# Patient Record
Sex: MALE
[De-identification: names, ages, dates, MRNs, and addresses within clinical notes are randomized per-mention and may not be internally consistent; named-entity substitution may affect disease eponyms.]

---

## 2019-11-19 ENCOUNTER — RECORD ABSTRACTING (OUTPATIENT)
Age: 74
End: 2019-11-19

## 2019-11-19 DIAGNOSIS — Z78.9 OTHER SPECIFIED HEALTH STATUS: ICD-10-CM

## 2019-11-19 DIAGNOSIS — Z87.438 PERSONAL HISTORY OF OTHER DISEASES OF MALE GENITAL ORGANS: ICD-10-CM

## 2019-11-19 DIAGNOSIS — R31.29 OTHER MICROSCOPIC HEMATURIA: ICD-10-CM

## 2019-11-19 DIAGNOSIS — R39.9 UNSPECIFIED SYMPTOMS AND SIGNS INVOLVING THE GENITOURINARY SYSTEM: ICD-10-CM

## 2019-11-19 DIAGNOSIS — Z87.898 PERSONAL HISTORY OF OTHER SPECIFIED CONDITIONS: ICD-10-CM

## 2019-11-19 LAB
PSA FREE FLD-MCNC: 33
PSA FREE SERPL-MCNC: 0.8
PSA SERPL-MCNC: 2.4
TESTOST BND SERPL-MCNC: 403.8

## 2019-11-19 RX ORDER — ATORVASTATIN CALCIUM 80 MG/1
TABLET, FILM COATED ORAL
Refills: 0 | Status: ACTIVE | COMMUNITY

## 2019-11-19 RX ORDER — ASPIRIN 81 MG
81 TABLET, DELAYED RELEASE (ENTERIC COATED) ORAL
Refills: 0 | Status: ACTIVE | COMMUNITY

## 2019-12-10 PROBLEM — Z00.00 ENCOUNTER FOR PREVENTIVE HEALTH EXAMINATION: Noted: 2019-12-10

## 2019-12-17 ENCOUNTER — APPOINTMENT (OUTPATIENT)
Dept: UROLOGY | Facility: CLINIC | Age: 74
End: 2019-12-17

## 2019-12-19 ENCOUNTER — APPOINTMENT (OUTPATIENT)
Dept: UROLOGY | Facility: CLINIC | Age: 74
End: 2019-12-19
Payer: COMMERCIAL

## 2019-12-19 VITALS
BODY MASS INDEX: 25.01 KG/M2 | SYSTOLIC BLOOD PRESSURE: 134 MMHG | HEIGHT: 68 IN | TEMPERATURE: 94.8 F | WEIGHT: 165 LBS | DIASTOLIC BLOOD PRESSURE: 80 MMHG

## 2019-12-19 DIAGNOSIS — R31.9 HEMATURIA, UNSPECIFIED: ICD-10-CM

## 2019-12-19 DIAGNOSIS — Z00.00 ENCOUNTER FOR GENERAL ADULT MEDICAL EXAMINATION W/OUT ABNORMAL FINDINGS: ICD-10-CM

## 2019-12-19 LAB
BILIRUB UR QL STRIP: NEGATIVE
CLARITY UR: CLEAR
COLLECTION METHOD: NORMAL
GLUCOSE UR-MCNC: NEGATIVE
HCG UR QL: 0.2 EU/DL
HGB UR QL STRIP.AUTO: NEGATIVE
KETONES UR-MCNC: NEGATIVE
LEUKOCYTE ESTERASE UR QL STRIP: NEGATIVE
NITRITE UR QL STRIP: NEGATIVE
PH UR STRIP: 5
PROT UR STRIP-MCNC: NEGATIVE
SP GR UR STRIP: 1.02

## 2019-12-19 PROCEDURE — 81003 URINALYSIS AUTO W/O SCOPE: CPT | Mod: QW

## 2019-12-19 PROCEDURE — 76857 US EXAM PELVIC LIMITED: CPT

## 2019-12-19 PROCEDURE — 99213 OFFICE O/P EST LOW 20 MIN: CPT | Mod: 25

## 2019-12-19 PROCEDURE — 51741 ELECTRO-UROFLOWMETRY FIRST: CPT

## 2019-12-19 NOTE — PHYSICAL EXAM
[General Appearance - Well Developed] : well developed [General Appearance - Well Nourished] : well nourished [Normal Appearance] : normal appearance [General Appearance - In No Acute Distress] : no acute distress [Well Groomed] : well groomed [Abdomen Soft] : soft [Costovertebral Angle Tenderness] : no ~M costovertebral angle tenderness [Abdomen Tenderness] : non-tender [Skin Color & Pigmentation] : normal skin color and pigmentation [] : no respiratory distress [Heart Rate And Rhythm] : Heart rate and rhythm were normal [Mood] : the mood was normal [Affect] : the affect was normal [Oriented To Time, Place, And Person] : oriented to person, place, and time [No Focal Deficits] : no focal deficits [Normal Station and Gait] : the gait and station were normal for the patient's age [Not Anxious] : not anxious [Inguinal Lymph Nodes Enlarged Bilaterally] : inguinal [FreeTextEntry1] : No change from last visit

## 2019-12-19 NOTE — ASSESSMENT
[FreeTextEntry1] : BPH stable with no signs of gross hematuria. Flow acceptable but low volume to start.\par \par Unless symptoms change I will see this gentleman in August for followup of his PPH prostate and the urine atypia. He was again instructed to come with a full bladder to perform uroflowmetry at that time.\par \par Lorena Tee M.D.

## 2019-12-19 NOTE — HISTORY OF PRESENT ILLNESS
[FreeTextEntry1] : 74M with h/o BPH found to have abnormal cytology with atypia. He underwent Cystoscopy on 9/10/19 which found BPH only. He also underwent CT scan which confirmed an enlarged prostate only on 8/26/19. He is here for 3 month followup of these problems for URoflometry, PVR, cytology and FISH test.\par \par Uroflow today good flow with small voided volume. PVR 37 cc, Prostate 44 G.  \par \par PSA 2.4 (33%) 08/07/2019.\par UA dipstick today negaive.\par \par

## 2019-12-19 NOTE — LETTER BODY
[Dear  ___] : Dear  [unfilled], [Courtesy Letter:] : I had the pleasure of seeing your patient, [unfilled], in my office today. [Please see my note below.] : Please see my note below. [Consult Closing:] : Thank you very much for allowing me to participate in the care of this patient.  If you have any questions, please do not hesitate to contact me. [FreeTextEntry3] : Best Regards, \par \par Lorena Tee MD\par

## 2019-12-23 LAB
BACTERIA UR CULT: NORMAL
URINE CYTOLOGY: NORMAL

## 2019-12-27 LAB — HLX UV FISH FINAL REPORT: NORMAL

## 2020-07-07 ENCOUNTER — APPOINTMENT (OUTPATIENT)
Dept: UROLOGY | Facility: CLINIC | Age: 75
End: 2020-07-07
Payer: COMMERCIAL

## 2020-07-07 PROCEDURE — 99214 OFFICE O/P EST MOD 30 MIN: CPT | Mod: 95

## 2020-07-07 RX ORDER — TAMSULOSIN HYDROCHLORIDE 0.4 MG/1
0.4 CAPSULE ORAL
Qty: 90 | Refills: 3 | Status: ACTIVE | COMMUNITY
Start: 2020-07-07 | End: 1900-01-01

## 2020-07-07 NOTE — HISTORY OF PRESENT ILLNESS
[Home] : at home, [unfilled] , at the time of the visit. [Other Location: e.g. Home (Enter Location, City,State)___] : at [unfilled] [Spouse] : spouse [Verbal consent obtained from patient] : the patient, [unfilled] [FreeTextEntry1] :  The patient-doctor relationship has been established in a face to face fashion via real time video/audio HIPAA compliant communication using telemedicine software. The patient's identity has been confirmed. The patient was previously emailed a copy of the telemedicine consent. They have had a chance to review and has now given verbal consent and has requested care to be assessed and treated through telemedicine and understands there maybe limitations in this process and they may need further follow up care in the office and or hospital settings. \par Verbal consent given on 7/7/2020, at 9:55 AM, by Quintin Elizondo.\par \par This 75 year old male was last seen on 12/19/2019 for BPH and urinary atypia. He underwent CT scan on 8/26/2019 which showed BPH only, and cystoscopy on 9/10/2019 which confirmed BPH only. He had been on Flomax in the past (2017) but was off of it at the last visit and was doing well with minimal LUTS, good flow by uroflowmetry and PVR of 37 cc.  Last PSA from 11/19/2019 stable at 2.4.\par \par The patient tells me that he had a seizure 7 weeks ago and was subsequently diagnosed with a glioma, for which he had a craniotomy and removal 1 week ago. 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 The procedure was extended ( 10 hours) and he developed acute urinary retention post op. He was initially treated with a Levy but was sent home on CIC and it was recommended that he resume the tamsulosin. He has not yet started this. He presently performs CIC 2 x daily, and drains about 50 cc in the AM. He has had marked frequency and last night he was up several times to urinate with some urgency. His catheterized residual this AM was about 50 cc. He uses a 14 Fr. straight red rubber catheter.\par The patient denies any allergies, and is on anti seizure medication, along with his statin and ASA 81 mg.\par \par  The patient denies fevers, chills, nausea and or vomiting and no unexplained weight loss. \par All pertinent parts of the patient PFSH (past medical, family and social histories), laboratory, radiological studies and physician notes were reviewed prior to starting the face to face portion of the telemedicine visit. Questionnaire results were discussed with patient.\par

## 2020-07-07 NOTE — ASSESSMENT
[FreeTextEntry1] : We discussed possible causes for his acute retention, including his underlying BPH with the stress of a prolonged anesthesia, surgery on the brain for his glioma, and we also discussed possible UTI as contributing to his more recent symptoms. We discussed the fact that 100% of people on CIC have bacteruria, and we discussed the possibility that this would be a temporary situation. I reviewed with him the proper way and time t perform CIC, I recommended a C+S now, and I recommended that he resume taking tamsulosin 0.4 mg daily. I recommended that we FU in at most 3 months, or sooner if he has any trouble. We ended the video portion of the visit at 10:19 AM. Lorena Tee MD\par

## 2020-07-07 NOTE — LETTER BODY
[Consult Closing:] : Thank you very much for allowing me to participate in the care of this patient.  If you have any questions, please do not hesitate to contact me. [Please see my note below.] : Please see my note below. [Dear  ___] : Dear ~ROBI, [FreeTextEntry3] : Best personal regards,\par \par Lorena\par  [FreeTextEntry1] : I had the pleasure of evaluation of your patient today via a telehealth virtual visit.\par  [FreeTextEntry2] : Satya Camara MD

## 2020-08-21 ENCOUNTER — APPOINTMENT (OUTPATIENT)
Dept: UROLOGY | Facility: CLINIC | Age: 75
End: 2020-08-21

## 2021-05-05 ENCOUNTER — APPOINTMENT (OUTPATIENT)
Dept: UROLOGY | Facility: CLINIC | Age: 76
End: 2021-05-05
Payer: COMMERCIAL

## 2021-05-05 VITALS — SYSTOLIC BLOOD PRESSURE: 109 MMHG | TEMPERATURE: 97.1 F | DIASTOLIC BLOOD PRESSURE: 70 MMHG | HEART RATE: 55 BPM

## 2021-05-05 DIAGNOSIS — N40.1 BENIGN PROSTATIC HYPERPLASIA WITH LOWER URINARY TRACT SYMPMS: ICD-10-CM

## 2021-05-05 DIAGNOSIS — N13.8 BENIGN PROSTATIC HYPERPLASIA WITH LOWER URINARY TRACT SYMPMS: ICD-10-CM

## 2021-05-05 DIAGNOSIS — R33.9 RETENTION OF URINE, UNSPECIFIED: ICD-10-CM

## 2021-05-05 LAB
BILIRUB UR QL STRIP: NORMAL
CLARITY UR: CLEAR
COLLECTION METHOD: NORMAL
GLUCOSE UR-MCNC: NORMAL
HCG UR QL: 0.2 EU/DL
HGB UR QL STRIP.AUTO: NORMAL
KETONES UR-MCNC: NORMAL
LEUKOCYTE ESTERASE UR QL STRIP: NORMAL
NITRITE UR QL STRIP: NORMAL
PH UR STRIP: 5
PROT UR STRIP-MCNC: NORMAL
SP GR UR STRIP: 1.02

## 2021-05-05 PROCEDURE — 99072 ADDL SUPL MATRL&STAF TM PHE: CPT

## 2021-05-05 PROCEDURE — 81003 URINALYSIS AUTO W/O SCOPE: CPT | Mod: QW

## 2021-05-05 PROCEDURE — 99214 OFFICE O/P EST MOD 30 MIN: CPT | Mod: 25

## 2021-05-05 NOTE — ASSESSMENT
[FreeTextEntry1] : 75 year old man with glioblastoma s/p surgery c/b urinary retention that has since improved. He has mild LUTS off of medications. He is overall happy with his symptoms other than nocturia 3 times per night.  PSA was checked today. We discussed that this would likely be the last PSA given patient's age and co-morbidity. HALLIE was not performed at patient's request.\par  - Behavioral modifications: limit fluid intake prior to bedtime, limit caffeine and alcohol intake, elevate legs 2 hours prior to bedtime, double voiding to ensure complete emptying\par  - Can consider restarting flomax if nocturia becomes more bothersome\par  - F/U PSA\par  - F/U in one year

## 2021-05-05 NOTE — HISTORY OF PRESENT ILLNESS
[FreeTextEntry1] : 75 year old man with glioblastoma s/p surgery c/b urinary retention that has since resolved. He was self-cathing for a short time, but was able to stop after re-starting flomax. He has since stopped flomax and denies any major urinary issues. Primarily, he is bothered by nocturia. He waks 3 times at night. He drinks 4 decaffeinated teas during the day, last fluid 2-3 hours before bedtime and drinks 8 oz of fluid over night. He denies any fevers, chills, flank pain, hematuria, dysuria, UTI or retention.\par \par His AUA symptom score is:\par \par IPSS Questionnaire (AUA-7):\par \par Over the past month…\par 1) How often have you had a sensation of not emptying your bladder completely after you finish urinating? 0\par 2) How often have you had to urinate again less than two hours after you finished urinating? 2\par 3) How often have you found you stopped and started again several times when you urinated? 0\par 4) How difficult have you found it to postpone urination? 1\par 5) How often have you had a weak urinary stream? 0\par 6) How often have you had to push or strain to begin urination? 0\par 7) How many times did you most typically get up to urinate from the time you went to bed until the time you got up in the morning? 3\par \par Total score: 6\par 0-7 mildly symptomatic\par 8-19 moderately symptomatic\par 20-35 severely symptomatic\par \par Quality of life score (out of 6): 2\par \par PVR: 40 cc

## 2021-05-05 NOTE — LETTER BODY
[Dear  ___] : Dear  [unfilled], [Courtesy Letter:] : I had the pleasure of seeing your patient, [unfilled], in my office today. [Please see my note below.] : Please see my note below. [Consult Closing:] : Thank you very much for allowing me to participate in the care of this patient.  If you have any questions, please do not hesitate to contact me. [Sincerely,] : Sincerely, [FreeTextEntry3] : Raul Dolan MD

## 2021-05-05 NOTE — PHYSICAL EXAM

## 2021-05-07 LAB
PSA FREE FLD-MCNC: 35 %
PSA FREE SERPL-MCNC: 0.63 NG/ML
PSA SERPL-MCNC: 1.81 NG/ML